# Patient Record
Sex: FEMALE | Race: WHITE | Employment: FULL TIME | URBAN - METROPOLITAN AREA
[De-identification: names, ages, dates, MRNs, and addresses within clinical notes are randomized per-mention and may not be internally consistent; named-entity substitution may affect disease eponyms.]

---

## 2024-10-23 ENCOUNTER — HOSPITAL ENCOUNTER (EMERGENCY)
Age: 29
Discharge: HOME | End: 2024-10-23
Payer: COMMERCIAL

## 2024-10-23 ENCOUNTER — APPOINTMENT (OUTPATIENT)
Dept: CT IMAGING | Age: 29
End: 2024-10-23
Payer: COMMERCIAL

## 2024-10-23 VITALS
HEART RATE: 76 BPM | HEIGHT: 68 IN | SYSTOLIC BLOOD PRESSURE: 130 MMHG | WEIGHT: 160 LBS | DIASTOLIC BLOOD PRESSURE: 86 MMHG | RESPIRATION RATE: 16 BRPM | OXYGEN SATURATION: 100 % | TEMPERATURE: 98.3 F | BODY MASS INDEX: 24.25 KG/M2

## 2024-10-23 DIAGNOSIS — S09.90XA CLOSED HEAD INJURY, INITIAL ENCOUNTER: Primary | ICD-10-CM

## 2024-10-23 PROCEDURE — 6370000000 HC RX 637 (ALT 250 FOR IP)

## 2024-10-23 PROCEDURE — 70450 CT HEAD/BRAIN W/O DYE: CPT

## 2024-10-23 PROCEDURE — 99284 EMERGENCY DEPT VISIT MOD MDM: CPT

## 2024-10-23 RX ORDER — ONDANSETRON 4 MG/1
4 TABLET, ORALLY DISINTEGRATING ORAL ONCE
Status: COMPLETED | OUTPATIENT
Start: 2024-10-23 | End: 2024-10-23

## 2024-10-23 RX ORDER — ONDANSETRON 4 MG/1
4 TABLET, ORALLY DISINTEGRATING ORAL 3 TIMES DAILY PRN
Qty: 21 TABLET | Refills: 0 | Status: SHIPPED | OUTPATIENT
Start: 2024-10-23

## 2024-10-23 RX ADMIN — ONDANSETRON 4 MG: 4 TABLET, ORALLY DISINTEGRATING ORAL at 18:23

## 2024-10-23 ASSESSMENT — ENCOUNTER SYMPTOMS
GASTROINTESTINAL NEGATIVE: 1
PHOTOPHOBIA: 1
RESPIRATORY NEGATIVE: 1
ALLERGIC/IMMUNOLOGIC NEGATIVE: 1

## 2024-10-23 ASSESSMENT — PAIN SCALES - GENERAL: PAINLEVEL_OUTOF10: 4

## 2024-10-23 ASSESSMENT — LIFESTYLE VARIABLES
HOW MANY STANDARD DRINKS CONTAINING ALCOHOL DO YOU HAVE ON A TYPICAL DAY: PATIENT DOES NOT DRINK
HOW OFTEN DO YOU HAVE A DRINK CONTAINING ALCOHOL: NEVER

## 2024-10-23 ASSESSMENT — PAIN DESCRIPTION - LOCATION: LOCATION: HEAD

## 2024-10-23 ASSESSMENT — PAIN - FUNCTIONAL ASSESSMENT: PAIN_FUNCTIONAL_ASSESSMENT: 0-10

## 2024-10-23 NOTE — ED NOTES
Patient mobility status  with no difficulty. Provider aware     I have reviewed discharge instructions with the patient.  The patient verbalized understanding.    Patient left ED via Discharge Method: ambulatory to Home with Significant Other.    Opportunity for questions and clarification provided.     Patient given 1 scripts.

## 2024-10-23 NOTE — DISCHARGE INSTRUCTIONS
As we discussed, your head CT did not reveal any emergent abnormalities.  Please keep your appointment with Worker's Comp. tomorrow for further recommendations.  I have sent you home with an as needed prescription for Zofran for nausea and vomiting control.  Also provided you information in regards to concussion and closed head injuries.  Please read through and follow those instructions.  As we discussed, please return to the emergency department for any new, worsening, concerning symptoms.

## 2024-10-23 NOTE — ED TRIAGE NOTES
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  Denies LOC, states she had mild headache, which progressivly worse throughout the day.  Pt states she had nausea that started this afternoon.  She states she feels drowsy.  Pt referred to ED by HR.  Pt rates HA at 4/10.

## 2024-10-23 NOTE — ED PROVIDER NOTES
Emergency Department Provider Note       PCP: Palak Hopson APRN - NP   Age: 29 y.o.   Sex: female     DISPOSITION Decision To Discharge 10/23/2024 07:11:01 PM    ICD-10-CM    1. 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  Patient states that she developed the above symptoms over time and had concerns for intracranial abnormalities.  CT scan was obtained which did not show any evidence of acute intracranial abnormalities.  Nausea is improved with medications given here.  I will send home with a course of as needed Zofran.  Patient reports she has a follow-up appointment with Worker's Comp. in the morning so I will defer further care to Worker's Comp.  Patient may follow-up with her PCP in 1 to 2 weeks for recheck of symptoms and to receive further recommendations.  Very strict return precautions were discussed with patient which she verbalizes understanding.  ED Course as of 10/23/24 1911   Wed Oct 23, 2024   1905 CT per radiology read.  IMPRESSION:  No CT evidence of acute intracranial abnormality.     Electronically signed by SARTHAK REBOLLEDO   [TC]      ED Course User Index  [TC] Kelvin Taylor APRN - NP     1 acute, uncomplicated illness or injury.  Prescription drug management performed.  Patient was discharged risks and benefits of hospitalization were considered.  Shared medical decision making was utilized in creating the patients health plan today.  I independently ordered and reviewed each unique test.    I reviewed external records: ED visit note from an outside group.  I reviewed external records: provider visit note from PCP.    history provided by patient.  Patient is alert and

## 2024-12-03 ENCOUNTER — TELEPHONE (OUTPATIENT)
Dept: NEUROLOGY | Age: 29
End: 2024-12-03

## 2024-12-03 NOTE — TELEPHONE ENCOUNTER
Spoke with Devi Han ( for Occupational Health) regarding patient seeing a PA vs MD. Devi stated it should be fine to see the PA.

## 2024-12-04 ENCOUNTER — OFFICE VISIT (OUTPATIENT)
Dept: NEUROLOGY | Age: 29
End: 2024-12-04

## 2024-12-04 VITALS
DIASTOLIC BLOOD PRESSURE: 86 MMHG | HEART RATE: 76 BPM | OXYGEN SATURATION: 98 % | WEIGHT: 171.2 LBS | BODY MASS INDEX: 25.94 KG/M2 | HEIGHT: 68 IN | SYSTOLIC BLOOD PRESSURE: 130 MMHG

## 2024-12-04 DIAGNOSIS — S06.0X0D CONCUSSION WITHOUT LOSS OF CONSCIOUSNESS, SUBSEQUENT ENCOUNTER: Primary | ICD-10-CM

## 2024-12-04 RX ORDER — ACETAMINOPHEN 500 MG
500 TABLET ORAL EVERY 6 HOURS PRN
COMMUNITY

## 2024-12-04 ASSESSMENT — PATIENT HEALTH QUESTIONNAIRE - PHQ9
2. FEELING DOWN, DEPRESSED OR HOPELESS: NOT AT ALL
SUM OF ALL RESPONSES TO PHQ QUESTIONS 1-9: 0
1. LITTLE INTEREST OR PLEASURE IN DOING THINGS: NOT AT ALL
SUM OF ALL RESPONSES TO PHQ QUESTIONS 1-9: 0
SUM OF ALL RESPONSES TO PHQ QUESTIONS 1-9: 0
SUM OF ALL RESPONSES TO PHQ9 QUESTIONS 1 & 2: 0
SUM OF ALL RESPONSES TO PHQ QUESTIONS 1-9: 0

## 2024-12-04 NOTE — PROGRESS NOTES
[facility] [address], [address]  [place] [address]  [phone]      Chief Complaint   Patient presents with    New Patient     Concussion       HPI  Arina Jay is a 29 y.o. female with no significant past medical history who presents on referral from Dr. Aleksandr Torres with a chief complaint of concussion.  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  Patient turned with water speculates she was attempted to help him, and to avoid being sprayed by a large packet she jumped back, which resulted in her hitting her head quite harshly on the tile wall.  She did have a headache following this, but finished working with the patient.  Unfortunately several hours into the days progressed her headache got worse, with nausea onset, decreased concentration, and general feelings of nausea/malaise.  She did going to the emergency department where CT was performed which revealed no abnormalities.  Diagnosed with concussion.  She was put on light duty at work and upon following up with her occupational health program was referred to neurology.  She was on leave for several days, and then 1/2 days for about 2 weeks.  First full day of work was yesterday.  She endorses most of her symptoms have resolved, with the exception of when she is immersed in a significant amount of stimuli her headache will return and she will be very fatigued.  She still integrating slowly, and has avoided any intense exercises.  No current numbness/tingling, headache, vision changes, nausea, or malaise.         Past Medical History:   Diagnosis Date    Concussion 10/23/2024    Head injury 10/23/2024    Headache 10/23/2024    Neck pain 10/23/2024    Resolved now       History reviewed. No pertinent surgical history.    Family History   Problem Relation Age of Onset    Alzheimer's Disease Maternal Grandfather     Dementia Maternal Grandfather        Social History